# Patient Record
Sex: FEMALE | Race: WHITE | NOT HISPANIC OR LATINO | Employment: OTHER | URBAN - METROPOLITAN AREA
[De-identification: names, ages, dates, MRNs, and addresses within clinical notes are randomized per-mention and may not be internally consistent; named-entity substitution may affect disease eponyms.]

---

## 2018-11-07 ENCOUNTER — APPOINTMENT (OUTPATIENT)
Dept: WOMENS IMAGING | Facility: HOSPITAL | Age: 63
End: 2018-11-07

## 2018-11-07 PROCEDURE — 77067 SCR MAMMO BI INCL CAD: CPT | Performed by: RADIOLOGY

## 2018-11-07 PROCEDURE — 77063 BREAST TOMOSYNTHESIS BI: CPT | Performed by: RADIOLOGY

## 2020-09-21 ENCOUNTER — OFFICE VISIT (OUTPATIENT)
Dept: INTERNAL MEDICINE | Facility: CLINIC | Age: 65
End: 2020-09-21

## 2020-09-21 VITALS
TEMPERATURE: 98 F | OXYGEN SATURATION: 100 % | HEIGHT: 65 IN | WEIGHT: 136 LBS | SYSTOLIC BLOOD PRESSURE: 108 MMHG | DIASTOLIC BLOOD PRESSURE: 58 MMHG | BODY MASS INDEX: 22.66 KG/M2 | HEART RATE: 55 BPM

## 2020-09-21 DIAGNOSIS — E03.9 HYPOTHYROIDISM, UNSPECIFIED TYPE: ICD-10-CM

## 2020-09-21 DIAGNOSIS — E28.39 ESTROGEN DEFICIENCY: ICD-10-CM

## 2020-09-21 DIAGNOSIS — J30.9 ALLERGIC RHINITIS, UNSPECIFIED SEASONALITY, UNSPECIFIED TRIGGER: Primary | ICD-10-CM

## 2020-09-21 PROBLEM — E78.00 PURE HYPERCHOLESTEROLEMIA: Status: ACTIVE | Noted: 2020-05-14

## 2020-09-21 PROCEDURE — 99203 OFFICE O/P NEW LOW 30 MIN: CPT | Performed by: NURSE PRACTITIONER

## 2020-09-21 RX ORDER — ACETAMINOPHEN 160 MG
10000 TABLET,DISINTEGRATING ORAL DAILY
COMMUNITY

## 2020-09-21 RX ORDER — ZINC 25 MG
50 TABLET ORAL DAILY
COMMUNITY
Start: 2020-04-01

## 2020-09-21 RX ORDER — CETIRIZINE HYDROCHLORIDE 5 MG/1
5 TABLET ORAL DAILY
COMMUNITY
End: 2023-02-28

## 2020-09-21 RX ORDER — ECHINACEA PURPUREA EXTRACT 125 MG
1 TABLET ORAL AS NEEDED
COMMUNITY

## 2020-09-21 RX ORDER — LEVOTHYROXINE SODIUM 0.12 MG/1
125 TABLET ORAL DAILY
COMMUNITY
Start: 2020-05-28 | End: 2020-11-03 | Stop reason: SDUPTHER

## 2020-09-21 NOTE — PROGRESS NOTES
Subjective   Jeff Pate is a 65 y.o. female. Patient is here today for   Chief Complaint   Patient presents with   • Hypothyroidism   • Hyperlipidemia   .    History of Present Illness   New patient here to establish care.  Health history and questionnaire have been reviewed in its entirety. The patient's previous primary care provider was Dr. Connie Davies.     Patient has hyperlipidemia. She is intolerant to statins due to muscle aches.     Patient has hypothyroidism and is currently on levothyroxine 125 mcg daily. She does not need a refill at this time.     C/o constant post-nasal drainage which causes her to have to clear her throat frequently. Her voice is hoarse. She has seen an ENT who did a scope and states that her voice change is due to the post-nasal drainage.  She has had allergy testing several years [place].  She would like a referral to an allergist    Patient is postmenopausal and uses a compounded cream from [facility].  She is requesting a refill on this medicine. She had a hysterectomy     The following portions of the patient's history were reviewed and updated as appropriate: allergies, current medications, past family history, past medical history, past social history, past surgical history and problem list.    Review of Systems   Constitutional: Negative.    HENT: Positive for postnasal drip and voice change.    Respiratory: Negative.    Cardiovascular: Negative.    Psychiatric/Behavioral: Negative.        Objective   Vitals:    09/21/20 1409   BP: 108/58   Pulse: 55   Temp: 98 °F (36.7 °C)   SpO2: 100%     Body mass index is 22.63 kg/m².  Physical Exam  Vitals signs and nursing note reviewed.   Constitutional:       Appearance: She is well-developed.   HENT:      Right Ear: Tympanic membrane and ear canal normal.      Left Ear: Tympanic membrane and ear canal normal.   Cardiovascular:      Rate and Rhythm: Normal rate and regular rhythm.       Heart sounds: Normal heart sounds.   Pulmonary:      Effort: Pulmonary effort is normal.      Breath sounds: Normal breath sounds.   Skin:     General: Skin is warm and dry.   Psychiatric:         Speech: Speech normal.         Behavior: Behavior normal.         Thought Content: Thought content normal.         Assessment/Plan   Jeff was seen today for hypothyroidism and hyperlipidemia.    Diagnoses and all orders for this visit:    Allergic rhinitis, unspecified seasonality, unspecified trigger  -     Ambulatory Referral to Allergy    Hypothyroidism, unspecified type    Estrogen deficiency    Patient will have [facility] fax prescription to this office to be signed.     F/u in May 2021 for physical and fasting labs.      Current Outpatient Medications:   •  Ascorbic Acid (Vitamin C) 500 MG chewable tablet, Chew 500 mg Daily., Disp: , Rfl:   •  cetirizine (zyrTEC) 5 MG tablet, Take 5 mg by mouth Daily., Disp: , Rfl:   •  Cholecalciferol (Vitamin D3) 50 MCG (2000 UT) capsule, Take 10,000 Units by mouth Daily., Disp: , Rfl:   •  levothyroxine (SYNTHROID, LEVOTHROID) 125 MCG tablet, Take 125 mcg by mouth Daily., Disp: , Rfl:   •  sodium chloride 0.65 % nasal spray, 1 spray into the nostril(s) as directed by provider As Needed for Congestion., Disp: , Rfl:   •  Unable to find, 1 each 1 (One) Time. Estriol-estradiol (50:50TD), Disp: , Rfl:   •  Zinc 25 MG tablet, Take 50 mg by mouth Daily., Disp: , Rfl:

## 2020-11-03 ENCOUNTER — PATIENT MESSAGE (OUTPATIENT)
Dept: INTERNAL MEDICINE | Facility: CLINIC | Age: 65
End: 2020-11-03

## 2020-11-03 RX ORDER — LEVOTHYROXINE SODIUM 0.12 MG/1
125 TABLET ORAL DAILY
Qty: 90 TABLET | Refills: 1 | Status: SHIPPED | OUTPATIENT
Start: 2020-11-03 | End: 2021-09-13

## 2020-11-03 NOTE — TELEPHONE ENCOUNTER
From: Jeff Pate  To: SYLVAIN Velasco  Sent: 11/3/2020 9:50 AM EST  Subject: Prescription Question    Hi,  I am trying to get refills on my levothyroxine and also my Bi-Est but the refill page says none of my meds can be refilled by [facility] at this time. Please advise.    Levothyroxine is refilled at [facility] - [phone]  Bi-Est is refilled through [facility] - [phone]    I will need both meds by the end of the week.    Thank you.  Jeff Pate (Gerri)

## 2021-02-05 ENCOUNTER — TELEPHONE (OUTPATIENT)
Dept: INTERNAL MEDICINE | Facility: CLINIC | Age: 66
End: 2021-02-05

## 2021-02-05 ENCOUNTER — TELEMEDICINE (OUTPATIENT)
Dept: INTERNAL MEDICINE | Facility: CLINIC | Age: 66
End: 2021-02-05

## 2021-02-05 DIAGNOSIS — Z20.822 FEVER WITH EXPOSURE TO COVID-19 VIRUS: Primary | ICD-10-CM

## 2021-02-05 DIAGNOSIS — R50.9 FEVER WITH EXPOSURE TO COVID-19 VIRUS: Primary | ICD-10-CM

## 2021-02-05 DIAGNOSIS — J06.9 ACUTE URI: ICD-10-CM

## 2021-02-05 DIAGNOSIS — R11.0 NAUSEA: ICD-10-CM

## 2021-02-05 PROCEDURE — 99213 OFFICE O/P EST LOW 20 MIN: CPT | Performed by: NURSE PRACTITIONER

## 2021-02-05 RX ORDER — ONDANSETRON HYDROCHLORIDE 8 MG/1
8 TABLET, FILM COATED ORAL EVERY 8 HOURS PRN
Qty: 12 TABLET | Refills: 0 | Status: SHIPPED | OUTPATIENT
Start: 2021-02-05 | End: 2021-07-12

## 2021-02-05 NOTE — TELEPHONE ENCOUNTER
PT IS CALLING IN STATING THAT HER  TESTED POSITIVE FOR COVID ON Saturday AND PT HAS NOT BEEN TESTED BUT STATES THAT SHE HAS SOME SINUS DRAINAGE AND CONGESTION, LOW GRADE TEMP, AND GETS NAUSEOUS.  PT WANTS TO KNOW IF SOMETHING CAN BE CALLED IN FOR HER TO HELP WITH THIS.    PT CALL BACK  [phone]  PHARMACY  [address]  [phone]

## 2021-02-05 NOTE — PROGRESS NOTES
Chief Complaint  Fever (Pt c/o nausea, fever, sinus drainage X tuesday. PT's  is positive COVID. Pt not wanting to do covid test.), Nausea, and Sinus Problem    Subjective          Jeff Pate presents to [facility] for   History of Present Illness  You have chosen to receive care through a telehealth visit.  Do you consent to use a video/audio connection for your medical care today? Yes    She is here today as a new patient to me for a telehealth visit using [employer] with c/o nausea, fever and sinus drainage that began on Tuesday. The nausea is intermittent, occurs when she gets up. She has not vomited. T max 99.5. Sinus drainage mild. Having fatigue. She is hydrating well and resting.    Denies chest pain, SOA, palpitations, cough, wheezing, dysgeusia or anosmia, dizziness, HA or syncope.     Her  is positive for COVID 19 on Saturday. She has been quarantining at home.  Symptoms staying the same.    Objective   Vital Signs:   There were no vitals taken for this visit.    Physical Exam  Constitutional:       General: She is awake.      Appearance: She is ill-appearing.   Pulmonary:      Effort: Pulmonary effort is normal.   Neurological:      General: No focal deficit present.      Mental Status: She is alert and oriented to person, place, and time. Mental status is at baseline.   Psychiatric:         Attention and Perception: Attention and perception normal.         Mood and Affect: Mood and affect normal.         Speech: Speech normal.         Behavior: Behavior normal. Behavior is cooperative.         Thought Content: Thought content normal.         Cognition and Memory: Cognition and memory normal.         Judgment: Judgment normal.        Result Review :     Common labs    Common Labsle 5/14/20 5/14/20 5/14/20 5/14/20    1011 1011 1011 1011   Glucose  98     BUN  16     Creatinine  0.7     Sodium  139     Potassium  4.2     Chloride  104     Calcium  9.2      Albumin  4.1     Total Bilirubin  0.3     Alkaline Phosphatase  69     AST (SGOT)  19     ALT (SGPT)  14     WBC    7.14   Hemoglobin    13.3   Hematocrit    41.9   Platelets    273   Total Cholesterol   241 (A)    Triglycerides   169 (A)    HDL Cholesterol   46 (A)    LDL Cholesterol    161 (A)    Hemoglobin A1C 5.4      (A) Abnormal value       Comments are available for some flowsheets but are not being displayed.                         Assessment and Plan    Problem List Items Addressed This Visit     None      Visit Diagnoses     Fever with exposure to COVID-19 virus    -  Primary    Nausea        Relevant Medications    ondansetron (Zofran) 8 MG tablet    Acute URI            1. Fever with exposure to COVID 19- she defers COVID 19 test today. Instructed her to continue to quarantine at home x 10 days from symptom onset and until fever free. Tylenol or ibuprofen PRN. Hydrate well, rest.  2. Acute URI- probable COVID 19 viral infection. Continue zinc, vit C, vit D. Hydrate, rest, activity as tolerated, nasal saline spray, warm fluids, mucinex PRN. Notify for worsening symptoms.  3. Nausea- Zofran 8 mg PRN for nausea. Small frequent meals, bland diet, alba and peppermint.    Doximity visit lasting 10 minutes.      Follow Up   No follow-ups on file.  Patient was given instructions and counseling regarding her condition or for health maintenance advice. Please see specific information pulled into the AVS if appropriate.

## 2021-05-03 ENCOUNTER — TELEPHONE (OUTPATIENT)
Dept: INTERNAL MEDICINE | Facility: CLINIC | Age: 66
End: 2021-05-03

## 2021-05-03 NOTE — TELEPHONE ENCOUNTER
Caller: Rio Jeff    Relationship: Self    Best call back number: [phone]     What is the medical concern/diagnosis: MAMMOGRAM    What specialty or service is being requested: MAMMOGRAM    What is the provider, practice or medical service name: [facility]      Any additional details: PATIENT WANTS TO GET MAMMOGRAM REFERRAL OR ORDERS SENT TO [facility] AND SHE NEEDS TO HAVE ULTRA SOUND WITH THAT AS WELL.

## 2021-05-06 ENCOUNTER — OFFICE VISIT (OUTPATIENT)
Dept: INTERNAL MEDICINE | Facility: CLINIC | Age: 66
End: 2021-05-06

## 2021-05-06 VITALS
WEIGHT: 134.4 LBS | HEART RATE: 66 BPM | SYSTOLIC BLOOD PRESSURE: 158 MMHG | BODY MASS INDEX: 22.39 KG/M2 | OXYGEN SATURATION: 99 % | DIASTOLIC BLOOD PRESSURE: 76 MMHG | HEIGHT: 65 IN

## 2021-05-06 DIAGNOSIS — N60.02 BREAST CYST, LEFT: ICD-10-CM

## 2021-05-06 DIAGNOSIS — N64.4 BREAST PAIN: Primary | ICD-10-CM

## 2021-05-06 PROCEDURE — 99214 OFFICE O/P EST MOD 30 MIN: CPT | Performed by: NURSE PRACTITIONER

## 2021-05-06 NOTE — PROGRESS NOTES
Subjective   Jeff Pate is a 65 y.o. female. Patient is here today for   Chief Complaint   Patient presents with   • Breast Mass     Pt complains of having a lump in left breast that is tender and painful x1 week. Pt has been using a heating pad & Tylenol.    .    History of Present Illness   C/o left sided breast pain for about a week associated with a lump.  States that she does have a history of fibrocystic breasts  No discharge. She had increased her caffeine, but has decreased that recently.  She has also tried a heating pad and Tylenol which is helped some.    The following portions of the patient's history were reviewed and updated as appropriate: allergies, current medications, past family history, past medical history, past social history, past surgical history and problem list.    Review of Systems    Objective   Vitals:    05/06/21 0959   BP: 158/76   Pulse: 66   SpO2: 99%     Body mass index is 22.37 kg/m².  Physical Exam  Vitals and nursing note reviewed.   Constitutional:       Appearance: Normal appearance. She is well-developed.   Cardiovascular:      Rate and Rhythm: Normal rate and regular rhythm.      Heart sounds: Normal heart sounds.   Pulmonary:      Effort: Pulmonary effort is normal.      Breath sounds: Normal breath sounds.   Chest:       Skin:     General: Skin is warm and dry.   Neurological:      Mental Status: She is alert and oriented to person, place, and time.   Psychiatric:         Speech: Speech normal.         Behavior: Behavior normal.         Thought Content: Thought content normal.         Assessment/Plan   Diagnoses and all orders for this visit:    1. Breast pain (Primary)  -     Mammo Diagnostic Bilateral With CAD; Future  -     US breast bilateral complete; Future    2. Breast cyst, left  -     US breast bilateral complete; Future    Patient's previous mammograms were done at [facility]. Will call for the results

## 2021-05-24 DIAGNOSIS — E78.5 HYPERLIPEMIA, IDIOPATHIC FAMILIAL: ICD-10-CM

## 2021-05-24 DIAGNOSIS — Z00.00 HEALTHCARE MAINTENANCE: Primary | ICD-10-CM

## 2021-05-24 DIAGNOSIS — E03.9 HYPOTHYROIDISM, UNSPECIFIED TYPE: ICD-10-CM

## 2021-05-26 ENCOUNTER — TELEPHONE (OUTPATIENT)
Dept: INTERNAL MEDICINE | Facility: CLINIC | Age: 66
End: 2021-05-26

## 2021-06-01 DIAGNOSIS — Z00.00 HEALTHCARE MAINTENANCE: ICD-10-CM

## 2021-06-01 DIAGNOSIS — E78.5 HYPERLIPEMIA, IDIOPATHIC FAMILIAL: ICD-10-CM

## 2021-06-01 DIAGNOSIS — E03.9 HYPOTHYROIDISM, UNSPECIFIED TYPE: ICD-10-CM

## 2021-06-11 ENCOUNTER — APPOINTMENT (OUTPATIENT)
Dept: ULTRASOUND IMAGING | Facility: HOSPITAL | Age: 66
End: 2021-06-11

## 2021-06-11 ENCOUNTER — APPOINTMENT (OUTPATIENT)
Dept: MAMMOGRAPHY | Facility: HOSPITAL | Age: 66
End: 2021-06-11

## 2021-07-12 ENCOUNTER — OFFICE VISIT (OUTPATIENT)
Dept: INTERNAL MEDICINE | Facility: CLINIC | Age: 66
End: 2021-07-12

## 2021-07-12 VITALS
HEART RATE: 64 BPM | HEIGHT: 65 IN | WEIGHT: 138 LBS | DIASTOLIC BLOOD PRESSURE: 88 MMHG | OXYGEN SATURATION: 98 % | BODY MASS INDEX: 22.99 KG/M2 | SYSTOLIC BLOOD PRESSURE: 138 MMHG

## 2021-07-12 DIAGNOSIS — Z00.00 HEALTHCARE MAINTENANCE: Primary | ICD-10-CM

## 2021-07-12 DIAGNOSIS — Z12.11 COLON CANCER SCREENING: ICD-10-CM

## 2021-07-12 DIAGNOSIS — E03.9 HYPOTHYROIDISM, UNSPECIFIED TYPE: ICD-10-CM

## 2021-07-12 PROCEDURE — 99397 PER PM REEVAL EST PAT 65+ YR: CPT | Performed by: NURSE PRACTITIONER

## 2021-07-12 NOTE — PROGRESS NOTES
"Subjective   Jeff Pate is a 65 y.o. female and is here for a comprehensive physical exam. The patient reports no problems.    Do you take any herbs or supplements that were not prescribed by a doctor? vit D, vit C, zinc    Patient is here to follow up on hypothyroidism. She is currently on levothyroxine 125 mcg daily (she is taking 1/2 tab = 62.5 mcg/daily). Denies any concerns.    Patient is not fasting today to have her lipids checked.  States that her cholesterol is elevated and she cannot tolerate statins so does not want to have it checked.  She is asking about what dosage of red yeast rice she should take.    States that she checks her BP at home and it is 120/60     History:  Hysterectomy   She has a mammogram ordered in August.  She did have a tender lump a couple months ago and had a mammogram and ultrasound ordered at that time.  Due to insurance cost, patient has put that testing off until next month. States that the tenderness and lump have improved.     The following portions of the patient's history were reviewed and updated as appropriate: allergies, current medications, past family history, past medical history, past social history, past surgical history and problem list.    Review of Systems  Do you have pain that bothers you in your daily life? no  Pertinent items are noted in HPI.    Objective   /88   Pulse 64   Ht 165.1 cm (65\")   Wt 62.6 kg (138 lb)   SpO2 98%   BMI 22.96 kg/m²     General Appearance:    Alert, cooperative, no distress, appears stated age   Head:    Normocephalic, without obvious abnormality, atraumatic   Eyes:    PERRL, conjunctiva/corneas clear, EOM's intact, both eyes   Ears:    Normal TM's and external ear canals, both ears   Nose:   Nares normal, septum midline, mucosa normal, no drainage    or sinus tenderness   Throat:   Lips, mucosa, and tongue normal; teeth and gums normal   Neck:   Supple, symmetrical, trachea midline, no adenopathy;     thyroid:  no " enlargement/tenderness/nodules; no carotid    bruit   Back:     Symmetric, no curvature, ROM normal, no CVA tenderness   Lungs:     Clear to auscultation bilaterally, respirations unlabored   Chest Wall:    No tenderness or deformity    Heart:    Regular rate and rhythm, S1 and S2 normal, no murmur       Abdomen:     Soft, non-tender, bowel sounds active all four quadrants,     no masses, no organomegaly           Extremities:   Extremities normal, atraumatic, no cyanosis or edema   Pulses:   2+ and symmetric all extremities   Skin:   Skin color, texture, turgor normal, no rashes or lesions   Lymph nodes:   Cervical, supraclavicular, and axillary nodes normal   Neurologic:   Grossly intact, normal strength, sensation and reflexes     throughout     Reviewed lipid panel from 5/14/2020  Total cholesterol  241 mg/dL  HDL 46 mg/dL  LDL  161 mg/dL  Triglycerides  169  mg/dL    Assessment/Plan   Healthy female exam.      1. Diagnoses and all orders for this visit:    1. Healthcare maintenance (Primary)  -     DEXA Bone Density Axial; Future    2. Colon cancer screening  -     Cologuard - Stool, Per Rectum; Future    3. Hypothyroidism, unspecified type  -     TSH  -     T4, Free    HLD - Patient will start red yeast rice 2.4 gms daily. Recheck lipids in 6 months.    2. Patient Counseling:  --Nutrition: Stressed importance of moderation in sodium/caffeine intake, saturated fat and cholesterol, caloric balance, sufficient intake of fresh fruits, vegetables, fiber, calcium, iron.   --Exercise: Stressed the importance of regular exercise.    --Injury prevention: Discussed safety belts, safety helmets, smoke detector.   --Dental health: Discussed importance of regular tooth brushing, flossing, and dental visits.   --Immunizations reviewed.    3. Discussed the patient's BMI with her.  The BMI is in the acceptable range  4. Follow up in 6 months with fasting labs prior

## 2021-07-13 ENCOUNTER — APPOINTMENT (OUTPATIENT)
Dept: MAMMOGRAPHY | Facility: HOSPITAL | Age: 66
End: 2021-07-13

## 2021-07-13 ENCOUNTER — APPOINTMENT (OUTPATIENT)
Dept: ULTRASOUND IMAGING | Facility: HOSPITAL | Age: 66
End: 2021-07-13

## 2021-07-13 LAB
T4 FREE SERPL-MCNC: 1.28 NG/DL (ref 0.93–1.7)
TSH SERPL DL<=0.005 MIU/L-ACNC: 2.43 UIU/ML (ref 0.27–4.2)

## 2021-08-03 ENCOUNTER — HOSPITAL ENCOUNTER (OUTPATIENT)
Dept: ULTRASOUND IMAGING | Facility: HOSPITAL | Age: 66
Discharge: HOME | End: 2021-08-03

## 2021-08-03 ENCOUNTER — HOSPITAL ENCOUNTER (OUTPATIENT)
Dept: MAMMOGRAPHY | Facility: HOSPITAL | Age: 66
Discharge: HOME | End: 2021-08-03

## 2021-08-03 DIAGNOSIS — N64.4 BREAST PAIN: ICD-10-CM

## 2021-08-03 DIAGNOSIS — N60.02 BREAST CYST, LEFT: ICD-10-CM

## 2021-08-03 PROCEDURE — 77066 DX MAMMO INCL CAD BI: CPT

## 2021-08-03 PROCEDURE — 76642 ULTRASOUND BREAST LIMITED: CPT

## 2021-08-03 PROCEDURE — G0279 TOMOSYNTHESIS, MAMMO: HCPCS

## 2021-09-13 RX ORDER — LEVOTHYROXINE SODIUM 0.12 MG/1
TABLET ORAL
Qty: 90 TABLET | Refills: 0 | Status: SHIPPED | OUTPATIENT
Start: 2021-09-13 | End: 2022-03-24

## 2021-09-15 DIAGNOSIS — E78.5 HYPERLIPEMIA, IDIOPATHIC FAMILIAL: Primary | ICD-10-CM

## 2021-09-15 DIAGNOSIS — E78.00 PURE HYPERCHOLESTEROLEMIA: ICD-10-CM

## 2021-12-20 ENCOUNTER — APPOINTMENT (OUTPATIENT)
Dept: BONE DENSITY | Facility: HOSPITAL | Age: 66
End: 2021-12-20

## 2022-01-05 LAB
ALBUMIN SERPL-MCNC: 4 G/DL (ref 3.8–4.8)
ALBUMIN/GLOB SERPL: 1.3 {RATIO} (ref 1.2–2.2)
ALP SERPL-CCNC: 97 IU/L (ref 44–121)
ALT SERPL-CCNC: 18 IU/L (ref 0–32)
AST SERPL-CCNC: 18 IU/L (ref 0–40)
BILIRUB SERPL-MCNC: 0.4 MG/DL (ref 0–1.2)
BUN SERPL-MCNC: 14 MG/DL (ref 8–27)
BUN/CREAT SERPL: 18 (ref 12–28)
CALCIUM SERPL-MCNC: 9.4 MG/DL (ref 8.7–10.3)
CHLORIDE SERPL-SCNC: 103 MMOL/L (ref 96–106)
CHOLEST SERPL-MCNC: 250 MG/DL (ref 100–199)
CHOLEST/HDLC SERPL: 6.3 RATIO (ref 0–4.4)
CO2 SERPL-SCNC: 22 MMOL/L (ref 20–29)
CREAT SERPL-MCNC: 0.8 MG/DL (ref 0.57–1)
GLOBULIN SER CALC-MCNC: 3 G/DL (ref 1.5–4.5)
GLUCOSE SERPL-MCNC: 95 MG/DL (ref 65–99)
HDLC SERPL-MCNC: 40 MG/DL
LDLC SERPL CALC-MCNC: 148 MG/DL (ref 0–99)
POTASSIUM SERPL-SCNC: 4.3 MMOL/L (ref 3.5–5.2)
PROT SERPL-MCNC: 7 G/DL (ref 6–8.5)
SODIUM SERPL-SCNC: 142 MMOL/L (ref 134–144)
TRIGL SERPL-MCNC: 337 MG/DL (ref 0–149)
VLDLC SERPL CALC-MCNC: 62 MG/DL (ref 5–40)

## 2022-03-03 DIAGNOSIS — E03.9 HYPOTHYROIDISM, UNSPECIFIED TYPE: ICD-10-CM

## 2022-03-03 DIAGNOSIS — E78.5 HYPERLIPEMIA, IDIOPATHIC FAMILIAL: Primary | ICD-10-CM

## 2022-03-04 LAB
ALBUMIN SERPL-MCNC: 4.3 G/DL (ref 3.8–4.8)
ALBUMIN/GLOB SERPL: 1.4 {RATIO} (ref 1.2–2.2)
ALP SERPL-CCNC: 86 IU/L (ref 44–121)
ALT SERPL-CCNC: 15 IU/L (ref 0–32)
AST SERPL-CCNC: 16 IU/L (ref 0–40)
BASOPHILS # BLD AUTO: 0.1 X10E3/UL (ref 0–0.2)
BASOPHILS NFR BLD AUTO: 1 %
BILIRUB SERPL-MCNC: 0.3 MG/DL (ref 0–1.2)
BUN SERPL-MCNC: 16 MG/DL (ref 8–27)
BUN/CREAT SERPL: 18 (ref 12–28)
CALCIUM SERPL-MCNC: 9.3 MG/DL (ref 8.7–10.3)
CHLORIDE SERPL-SCNC: 104 MMOL/L (ref 96–106)
CO2 SERPL-SCNC: 22 MMOL/L (ref 20–29)
CREAT SERPL-MCNC: 0.87 MG/DL (ref 0.57–1)
EGFR GENE MUT ANL BLD/T: 73 ML/MIN/1.73
EOSINOPHIL # BLD AUTO: 0 X10E3/UL (ref 0–0.4)
EOSINOPHIL NFR BLD AUTO: 0 %
ERYTHROCYTE [DISTWIDTH] IN BLOOD BY AUTOMATED COUNT: 12.7 % (ref 11.7–15.4)
GLOBULIN SER CALC-MCNC: 3.1 G/DL (ref 1.5–4.5)
GLUCOSE SERPL-MCNC: 98 MG/DL (ref 65–99)
HCT VFR BLD AUTO: 40.2 % (ref 34–46.6)
HGB BLD-MCNC: 13.2 G/DL (ref 11.1–15.9)
IMM GRANULOCYTES # BLD AUTO: 0 X10E3/UL (ref 0–0.1)
IMM GRANULOCYTES NFR BLD AUTO: 0 %
LYMPHOCYTES # BLD AUTO: 2.9 X10E3/UL (ref 0.7–3.1)
LYMPHOCYTES NFR BLD AUTO: 42 %
MCH RBC QN AUTO: 29.1 PG (ref 26.6–33)
MCHC RBC AUTO-ENTMCNC: 32.8 G/DL (ref 31.5–35.7)
MCV RBC AUTO: 89 FL (ref 79–97)
MONOCYTES # BLD AUTO: 0.5 X10E3/UL (ref 0.1–0.9)
MONOCYTES NFR BLD AUTO: 7 %
NEUTROPHILS # BLD AUTO: 3.5 X10E3/UL (ref 1.4–7)
NEUTROPHILS NFR BLD AUTO: 50 %
PLATELET # BLD AUTO: 263 X10E3/UL (ref 150–450)
POTASSIUM SERPL-SCNC: 4.6 MMOL/L (ref 3.5–5.2)
PROT SERPL-MCNC: 7.4 G/DL (ref 6–8.5)
RBC # BLD AUTO: 4.54 X10E6/UL (ref 3.77–5.28)
SODIUM SERPL-SCNC: 141 MMOL/L (ref 134–144)
T4 FREE SERPL-MCNC: 1.25 NG/DL (ref 0.82–1.77)
TSH SERPL DL<=0.005 MIU/L-ACNC: 4.21 UIU/ML (ref 0.45–4.5)
WBC # BLD AUTO: 6.9 X10E3/UL (ref 3.4–10.8)

## 2022-03-24 RX ORDER — LEVOTHYROXINE SODIUM 0.12 MG/1
TABLET ORAL
Qty: 90 TABLET | Refills: 0 | Status: SHIPPED | OUTPATIENT
Start: 2022-03-24 | End: 2022-09-20

## 2022-04-12 ENCOUNTER — OFFICE VISIT (OUTPATIENT)
Dept: INTERNAL MEDICINE | Facility: CLINIC | Age: 67
End: 2022-04-12

## 2022-04-12 VITALS
HEART RATE: 64 BPM | BODY MASS INDEX: 22.16 KG/M2 | SYSTOLIC BLOOD PRESSURE: 140 MMHG | OXYGEN SATURATION: 98 % | DIASTOLIC BLOOD PRESSURE: 62 MMHG | WEIGHT: 133 LBS | HEIGHT: 65 IN

## 2022-04-12 DIAGNOSIS — E03.9 HYPOTHYROIDISM, UNSPECIFIED TYPE: Primary | ICD-10-CM

## 2022-04-12 DIAGNOSIS — R06.83 SNORING: ICD-10-CM

## 2022-04-12 PROCEDURE — 99214 OFFICE O/P EST MOD 30 MIN: CPT | Performed by: NURSE PRACTITIONER

## 2022-04-12 NOTE — PROGRESS NOTES
Subjective   Jeff Pate is a 66 y.o. female. Patient is here today for   Chief Complaint   Patient presents with   • Snoring     Pts  mentioned to her that she is snoring & stops breathing sometimes at night..    .    History of Present Illness   C/o snoring for about 5-6 years. 2 weeks ago, her spouse noticed apnea episodes. She would like a referral for a sleep study    Patient is here to follow up on hypothyroidism. Denies any concerns.     Patient has occasional RUQ abdominal pain. States that she watches what she eats which does help. She has also tried Kombucha which helps.     The following portions of the patient's history were reviewed and updated as appropriate: allergies, current medications, past family history, past medical history, past social history, past surgical history and problem list.    Review of Systems    Objective     Vitals:    04/12/22 0934   BP: 140/62   Pulse: 64   SpO2: 98%     Body mass index is 22.13 kg/m².    Patient's BP is slightly elevated today. States that it is 120s/70s at home    Orders Only on 03/03/2022   Component Date Value Ref Range Status   • WBC 03/03/2022 6.9  3.4 - 10.8 x10E3/uL Final   • RBC 03/03/2022 4.54  3.77 - 5.28 x10E6/uL Final   • Hemoglobin 03/03/2022 13.2  11.1 - 15.9 g/dL Final   • Hematocrit 03/03/2022 40.2  34.0 - 46.6 % Final   • MCV 03/03/2022 89  79 - 97 fL Final   • MCH 03/03/2022 29.1  26.6 - 33.0 pg Final   • MCHC 03/03/2022 32.8  31.5 - 35.7 g/dL Final   • RDW 03/03/2022 12.7  11.7 - 15.4 % Final   • Platelets 03/03/2022 263  150 - 450 x10E3/uL Final   • Neutrophil Rel % 03/03/2022 50  Not Estab. % Final   • Lymphocyte Rel % 03/03/2022 42  Not Estab. % Final   • Monocyte Rel % 03/03/2022 7  Not Estab. % Final   • Eosinophil Rel % 03/03/2022 0  Not Estab. % Final   • Basophil Rel % 03/03/2022 1  Not Estab. % Final   • Neutrophils Absolute 03/03/2022 3.5  1.4 - 7.0 x10E3/uL Final   • Lymphocytes Absolute 03/03/2022 2.9  0.7 - 3.1 x10E3/uL  Final   • Monocytes Absolute 03/03/2022 0.5  0.1 - 0.9 x10E3/uL Final   • Eosinophils Absolute 03/03/2022 0.0  0.0 - 0.4 x10E3/uL Final   • Basophils Absolute 03/03/2022 0.1  0.0 - 0.2 x10E3/uL Final   • Immature Granulocyte Rel % 03/03/2022 0  Not Estab. % Final   • Immature Grans Absolute 03/03/2022 0.0  0.0 - 0.1 x10E3/uL Final   • Glucose 03/03/2022 98  65 - 99 mg/dL Final   • BUN 03/03/2022 16  8 - 27 mg/dL Final   • Creatinine 03/03/2022 0.87  0.57 - 1.00 mg/dL Final   • EGFR Result 03/03/2022 73  >59 mL/min/1.73 Final    Comment: **In accordance with recommendations from the NKF-ASN Task force,**    [facility] has updated its eGFR calculation to the 2021 CKD-EPI    creatinine equation that estimates kidney function without a race    variable.     • BUN/Creatinine Ratio 03/03/2022 18  12 - 28 Final   • Sodium 03/03/2022 141  134 - 144 mmol/L Final   • Potassium 03/03/2022 4.6  3.5 - 5.2 mmol/L Final   • Chloride 03/03/2022 104  96 - 106 mmol/L Final   • Total CO2 03/03/2022 22  20 - 29 mmol/L Final   • Calcium 03/03/2022 9.3  8.7 - 10.3 mg/dL Final   • Total Protein 03/03/2022 7.4  6.0 - 8.5 g/dL Final   • Albumin 03/03/2022 4.3  3.8 - 4.8 g/dL Final   • Globulin 03/03/2022 3.1  1.5 - 4.5 g/dL Final   • A/G Ratio 03/03/2022 1.4  1.2 - 2.2 Final   • Total Bilirubin 03/03/2022 0.3  0.0 - 1.2 mg/dL Final   • Alkaline Phosphatase 03/03/2022 86  44 - 121 IU/L Final   • AST (SGOT) 03/03/2022 16  0 - 40 IU/L Final   • ALT (SGPT) 03/03/2022 15  0 - 32 IU/L Final   • TSH 03/03/2022 4.210  0.450 - 4.500 uIU/mL Final   • Free T4 03/03/2022 1.25  0.82 - 1.77 ng/dL Final     Reviewed labs with patient.     Physical Exam  Vitals and nursing note reviewed.   Constitutional:       Appearance: Normal appearance. She is well-developed.   Cardiovascular:      Rate and Rhythm: Normal rate and regular rhythm.      Heart sounds: Normal heart sounds.   Pulmonary:      Effort: Pulmonary effort is normal.      Breath sounds: Normal  breath sounds.   Abdominal:      General: Abdomen is flat. Bowel sounds are normal.      Palpations: Abdomen is soft.      Tenderness: There is no abdominal tenderness.   Skin:     General: Skin is warm and dry.   Neurological:      Mental Status: She is alert and oriented to person, place, and time.   Psychiatric:         Speech: Speech normal.         Behavior: Behavior normal.         Thought Content: Thought content normal.         Assessment/Plan   Diagnoses and all orders for this visit:    1. Hypothyroidism, unspecified type (Primary)    2. Snoring  -     Ambulatory Referral to Sleep Medicine    RU abdominal pain - patient will follow up if her pain continues or worsens.     Hypothyroidism - continue current dosage of levothyroxine.     DEXA at [facility]         Current Outpatient Medications:   •  Ascorbic Acid (Vitamin C) 500 MG chewable tablet, Chew 500 mg Daily., Disp: , Rfl:   •  cetirizine (zyrTEC) 5 MG tablet, Take 5 mg by mouth Daily., Disp: , Rfl:   •  Cholecalciferol (Vitamin D3) 50 MCG (2000 UT) capsule, Take 10,000 Units by mouth Daily., Disp: , Rfl:   •  levothyroxine (SYNTHROID, LEVOTHROID) 125 MCG tablet, TAKE ONE TABLET BY MOUTH EVERY DAY, Disp: 90 tablet, Rfl: 0  •  sodium chloride 0.65 % nasal spray, 1 spray into the nostril(s) as directed by provider As Needed for Congestion., Disp: , Rfl:   •  Unable to find, 1 each 1 (One) Time. Estriol-estradiol (50:50TD), Disp: , Rfl:   •  Zinc 25 MG tablet, Take 50 mg by mouth Daily., Disp: , Rfl:

## 2022-05-31 ENCOUNTER — OFFICE VISIT (OUTPATIENT)
Dept: INTERNAL MEDICINE | Facility: CLINIC | Age: 67
End: 2022-05-31

## 2022-05-31 VITALS
WEIGHT: 137 LBS | HEART RATE: 64 BPM | BODY MASS INDEX: 22.82 KG/M2 | DIASTOLIC BLOOD PRESSURE: 72 MMHG | SYSTOLIC BLOOD PRESSURE: 146 MMHG | TEMPERATURE: 96.3 F | HEIGHT: 65 IN | OXYGEN SATURATION: 97 %

## 2022-05-31 DIAGNOSIS — J01.00 ACUTE NON-RECURRENT MAXILLARY SINUSITIS: Primary | ICD-10-CM

## 2022-05-31 PROCEDURE — 99213 OFFICE O/P EST LOW 20 MIN: CPT | Performed by: NURSE PRACTITIONER

## 2022-05-31 RX ORDER — AMOXICILLIN 875 MG/1
875 TABLET, COATED ORAL 2 TIMES DAILY
Qty: 20 TABLET | Refills: 0 | Status: SHIPPED | OUTPATIENT
Start: 2022-05-31 | End: 2023-02-28

## 2022-05-31 NOTE — PROGRESS NOTES
Subjective   Jeff Pate is a 66 y.o. female. Patient is here today for   Chief Complaint   Patient presents with   • Sore Throat     Pt complains of having a ST & nasal congestion x3 days. Pt has been taking Nyquil.    .    History of Present Illness   C/o nasal congestion x 3 days associated with sore throat, sinus pressure. She has taken Nyquil helps at night. Symptoms are worse at night  She takes claritin daily for allergies. Denies fever, cough.     The following portions of the patient's history were reviewed and updated as appropriate: allergies, current medications, past family history, past medical history, past social history, past surgical history and problem list.    Review of Systems    Objective   Vitals:    05/31/22 1330   BP: 146/72   Pulse: 64   Temp: 96.3 °F (35.7 °C)   SpO2: 97%     Body mass index is 22.8 kg/m².  Physical Exam  Vitals and nursing note reviewed.   Constitutional:       Appearance: Normal appearance. She is well-developed.   Cardiovascular:      Rate and Rhythm: Normal rate and regular rhythm.      Heart sounds: Normal heart sounds.   Pulmonary:      Effort: Pulmonary effort is normal.      Breath sounds: Normal breath sounds.   Skin:     General: Skin is warm and dry.   Neurological:      Mental Status: She is alert and oriented to person, place, and time.   Psychiatric:         Speech: Speech normal.         Behavior: Behavior normal.         Thought Content: Thought content normal.       Patient declined a covid test.    Assessment & Plan   Diagnoses and all orders for this visit:    1. Acute non-recurrent maxillary sinusitis (Primary)  -     amoxicillin (AMOXIL) 875 MG tablet; Take 1 tablet by mouth 2 (Two) Times a Day.  Dispense: 20 tablet; Refill: 0      Continue OTC meds as needed. Increase fluids/rest

## 2022-06-16 ENCOUNTER — APPOINTMENT (OUTPATIENT)
Dept: SLEEP MEDICINE | Facility: HOSPITAL | Age: 67
End: 2022-06-16

## 2022-09-20 RX ORDER — LEVOTHYROXINE SODIUM 0.12 MG/1
TABLET ORAL
Qty: 90 TABLET | Refills: 1 | Status: SHIPPED | OUTPATIENT
Start: 2022-09-20 | End: 2023-02-28 | Stop reason: DRUGHIGH

## 2022-12-29 ENCOUNTER — TELEPHONE (OUTPATIENT)
Dept: INTERNAL MEDICINE | Facility: CLINIC | Age: 67
End: 2022-12-29

## 2022-12-29 NOTE — TELEPHONE ENCOUNTER
Caller: Jeff Pate    Relationship: Self    Best call back number: [phone]    What was the call regarding: PATIENT STATES THE MEDICATION BI-EST CAPSULES IS NEEDING A PRIOR AUTHORIZATION SENT OVER TO THE [facility]. THE PHARMACY TOLD THE PATIENT THAT THEY HAVE REACHED OUT TO TRY TO GET THIS COMPLETED.      PLEASE ADVISE     Do you require a callback: YES

## 2023-01-14 ENCOUNTER — PATIENT MESSAGE (OUTPATIENT)
Dept: INTERNAL MEDICINE | Facility: CLINIC | Age: 68
End: 2023-01-14
Payer: COMMERCIAL

## 2023-02-28 ENCOUNTER — TELEPHONE (OUTPATIENT)
Dept: INTERNAL MEDICINE | Facility: CLINIC | Age: 68
End: 2023-02-28

## 2023-02-28 ENCOUNTER — OFFICE VISIT (OUTPATIENT)
Dept: INTERNAL MEDICINE | Facility: CLINIC | Age: 68
End: 2023-02-28
Payer: COMMERCIAL

## 2023-02-28 VITALS
DIASTOLIC BLOOD PRESSURE: 60 MMHG | HEIGHT: 65 IN | BODY MASS INDEX: 22.81 KG/M2 | WEIGHT: 136.9 LBS | OXYGEN SATURATION: 98 % | HEART RATE: 68 BPM | TEMPERATURE: 98.1 F | SYSTOLIC BLOOD PRESSURE: 128 MMHG

## 2023-02-28 DIAGNOSIS — Z00.00 HEALTHCARE MAINTENANCE: Primary | ICD-10-CM

## 2023-02-28 DIAGNOSIS — E78.00 PURE HYPERCHOLESTEROLEMIA: ICD-10-CM

## 2023-02-28 DIAGNOSIS — R53.82 CHRONIC FATIGUE: ICD-10-CM

## 2023-02-28 DIAGNOSIS — Z78.0 MENOPAUSE: ICD-10-CM

## 2023-02-28 DIAGNOSIS — Z82.49 FAMILY HISTORY OF HEART DISEASE: ICD-10-CM

## 2023-02-28 DIAGNOSIS — F32.9 REACTIVE DEPRESSION: ICD-10-CM

## 2023-02-28 DIAGNOSIS — E03.9 HYPOTHYROIDISM, UNSPECIFIED TYPE: ICD-10-CM

## 2023-02-28 PROCEDURE — 99397 PER PM REEVAL EST PAT 65+ YR: CPT | Performed by: NURSE PRACTITIONER

## 2023-02-28 PROCEDURE — 99214 OFFICE O/P EST MOD 30 MIN: CPT | Performed by: NURSE PRACTITIONER

## 2023-02-28 RX ORDER — AMOXICILLIN 500 MG
CAPSULE ORAL
COMMUNITY
Start: 2023-02-01

## 2023-02-28 RX ORDER — LEVOTHYROXINE SODIUM 88 UG/1
88 TABLET ORAL DAILY
Qty: 90 TABLET | Refills: 1 | Status: SHIPPED | OUTPATIENT
Start: 2023-02-28

## 2023-05-03 ENCOUNTER — TELEPHONE (OUTPATIENT)
Dept: INTERNAL MEDICINE | Facility: CLINIC | Age: 68
End: 2023-05-03

## 2023-05-03 NOTE — TELEPHONE ENCOUNTER
"Caller: Jeff Pate \"Kristy\"    Relationship: Self    Best call back number: [phone]    What medication are you requesting: BI-EST (50-50) PROG-TEST    If a prescription is needed, what is your preferred pharmacy and phone number: [facility] - [place], [place] - [address] - [phone] [facility] [phone] FX     Additional notes:MARIUSZ STATED SHE PREVIOUSLY WAS PRESCRIBED THIS MEDICATION BY JAYME MATTHEWS, AND WILL NEED THIS SENT TO HER COMPOUNDING PHARMACY ASAP.    PATIENT STATED THE PHARMACY WILL BE CLOSED ON 05-, AND SHE WOULD LIKE TO  BEFORE THEN.  "

## 2023-09-07 DIAGNOSIS — E03.9 HYPOTHYROIDISM, UNSPECIFIED TYPE: ICD-10-CM

## 2023-09-07 RX ORDER — LEVOTHYROXINE SODIUM 88 UG/1
TABLET ORAL
Qty: 90 TABLET | Refills: 1 | Status: SHIPPED | OUTPATIENT
Start: 2023-09-07

## 2023-09-21 ENCOUNTER — TELEPHONE (OUTPATIENT)
Dept: INTERNAL MEDICINE | Facility: CLINIC | Age: 68
End: 2023-09-21
Payer: COMMERCIAL

## 2023-09-21 DIAGNOSIS — E78.00 PURE HYPERCHOLESTEROLEMIA: ICD-10-CM

## 2023-09-21 DIAGNOSIS — E03.9 HYPOTHYROIDISM, UNSPECIFIED TYPE: Primary | ICD-10-CM

## 2023-09-21 NOTE — TELEPHONE ENCOUNTER
----- Message from Susannah Jean-Baptiste MD sent at 9/21/2023  7:46 AM EDT -----  Cmp flp tsh cbc  ----- Message -----  From: Vanita Fish MA  Sent: 9/20/2023   4:38 PM EDT  To: Susannah Jean-Baptiste MD    PT MUST BE OURS NOW. PLEASE ADVISE ON LABS

## 2023-09-22 LAB
ALBUMIN SERPL-MCNC: 4.4 G/DL (ref 3.5–5.2)
ALBUMIN/GLOB SERPL: 1.6 G/DL
ALP SERPL-CCNC: 82 U/L (ref 39–117)
ALT SERPL-CCNC: 14 U/L (ref 1–33)
AST SERPL-CCNC: 17 U/L (ref 1–32)
BASOPHILS # BLD AUTO: 0.05 10*3/MM3 (ref 0–0.2)
BASOPHILS NFR BLD AUTO: 0.8 % (ref 0–1.5)
BILIRUB SERPL-MCNC: 0.3 MG/DL (ref 0–1.2)
BUN SERPL-MCNC: 20 MG/DL (ref 8–23)
BUN/CREAT SERPL: 27 (ref 7–25)
CALCIUM SERPL-MCNC: 9.8 MG/DL (ref 8.6–10.5)
CHLORIDE SERPL-SCNC: 104 MMOL/L (ref 98–107)
CHOLEST SERPL-MCNC: 234 MG/DL (ref 0–200)
CO2 SERPL-SCNC: 26.4 MMOL/L (ref 22–29)
CREAT SERPL-MCNC: 0.74 MG/DL (ref 0.57–1)
EGFRCR SERPLBLD CKD-EPI 2021: 87.7 ML/MIN/1.73
EOSINOPHIL # BLD AUTO: 0.03 10*3/MM3 (ref 0–0.4)
EOSINOPHIL NFR BLD AUTO: 0.5 % (ref 0.3–6.2)
ERYTHROCYTE [DISTWIDTH] IN BLOOD BY AUTOMATED COUNT: 13 % (ref 12.3–15.4)
GLOBULIN SER CALC-MCNC: 2.8 GM/DL
GLUCOSE SERPL-MCNC: 106 MG/DL (ref 65–99)
HCT VFR BLD AUTO: 40.5 % (ref 34–46.6)
HDLC SERPL-MCNC: 46 MG/DL (ref 40–60)
HGB BLD-MCNC: 13.2 G/DL (ref 12–15.9)
IMM GRANULOCYTES # BLD AUTO: 0.01 10*3/MM3 (ref 0–0.05)
IMM GRANULOCYTES NFR BLD AUTO: 0.2 % (ref 0–0.5)
LDLC SERPL CALC-MCNC: 154 MG/DL (ref 0–100)
LDLC/HDLC SERPL: 3.29 {RATIO}
LYMPHOCYTES # BLD AUTO: 2.44 10*3/MM3 (ref 0.7–3.1)
LYMPHOCYTES NFR BLD AUTO: 38.1 % (ref 19.6–45.3)
MCH RBC QN AUTO: 28.3 PG (ref 26.6–33)
MCHC RBC AUTO-ENTMCNC: 32.6 G/DL (ref 31.5–35.7)
MCV RBC AUTO: 86.7 FL (ref 79–97)
MONOCYTES # BLD AUTO: 0.46 10*3/MM3 (ref 0.1–0.9)
MONOCYTES NFR BLD AUTO: 7.2 % (ref 5–12)
NEUTROPHILS # BLD AUTO: 3.41 10*3/MM3 (ref 1.7–7)
NEUTROPHILS NFR BLD AUTO: 53.2 % (ref 42.7–76)
NRBC BLD AUTO-RTO: 0 /100 WBC (ref 0–0.2)
PLATELET # BLD AUTO: 264 10*3/MM3 (ref 140–450)
POTASSIUM SERPL-SCNC: 4.5 MMOL/L (ref 3.5–5.2)
PROT SERPL-MCNC: 7.2 G/DL (ref 6–8.5)
RBC # BLD AUTO: 4.67 10*6/MM3 (ref 3.77–5.28)
SODIUM SERPL-SCNC: 141 MMOL/L (ref 136–145)
T4 FREE SERPL-MCNC: 1.63 NG/DL (ref 0.93–1.7)
TRIGL SERPL-MCNC: 184 MG/DL (ref 0–150)
TSH SERPL DL<=0.005 MIU/L-ACNC: 0.09 UIU/ML (ref 0.27–4.2)
VLDLC SERPL CALC-MCNC: 34 MG/DL (ref 5–40)
WBC # BLD AUTO: 6.4 10*3/MM3 (ref 3.4–10.8)

## 2023-09-28 ENCOUNTER — OFFICE VISIT (OUTPATIENT)
Dept: INTERNAL MEDICINE | Facility: CLINIC | Age: 68
End: 2023-09-28
Payer: COMMERCIAL

## 2023-09-28 VITALS
DIASTOLIC BLOOD PRESSURE: 68 MMHG | SYSTOLIC BLOOD PRESSURE: 130 MMHG | HEIGHT: 65 IN | WEIGHT: 132.7 LBS | HEART RATE: 57 BPM | BODY MASS INDEX: 22.11 KG/M2 | TEMPERATURE: 98.2 F | OXYGEN SATURATION: 98 %

## 2023-09-28 DIAGNOSIS — E03.9 HYPOTHYROIDISM, UNSPECIFIED TYPE: Primary | ICD-10-CM

## 2023-09-28 DIAGNOSIS — F32.9 REACTIVE DEPRESSION: ICD-10-CM

## 2023-09-28 DIAGNOSIS — R06.83 SNORING: ICD-10-CM

## 2023-09-28 PROCEDURE — 99214 OFFICE O/P EST MOD 30 MIN: CPT | Performed by: INTERNAL MEDICINE

## 2023-09-28 RX ORDER — CETIRIZINE HYDROCHLORIDE 10 MG/1
10 TABLET ORAL DAILY PRN
COMMUNITY

## 2023-09-28 NOTE — PROGRESS NOTES
Subjective   Jeff Pate is a 68 y.o. female.     Hypothyroidism    Hyperlipidemia  Exacerbating diseases include hypothyroidism.    Pt has been doing well with thyroid meds.  Taking as perscribed without any complications  She has been stable with zyrtec  She does feel tired all the time even if she sleeps well    The following portions of the patient's history were reviewed and updated as appropriate: allergies, current medications, past family history, past medical history, past social history, past surgical history, and problem list.    Review of Systems    Objective   Physical Exam  Vitals reviewed.   Constitutional:       Appearance: She is well-developed.   HENT:      Head: Normocephalic and atraumatic.      Right Ear: External ear normal.      Left Ear: External ear normal.   Eyes:      Conjunctiva/sclera: Conjunctivae normal.      Pupils: Pupils are equal, round, and reactive to light.   Neck:      Thyroid: No thyromegaly.      Trachea: No tracheal deviation.   Cardiovascular:      Rate and Rhythm: Normal rate and regular rhythm.      Heart sounds: Normal heart sounds.   Pulmonary:      Effort: Pulmonary effort is normal.      Breath sounds: Normal breath sounds.   Abdominal:      General: Bowel sounds are normal. There is no distension.      Palpations: Abdomen is soft.      Tenderness: There is no abdominal tenderness.   Musculoskeletal:         General: No deformity. Normal range of motion.      Cervical back: Normal range of motion.   Skin:     General: Skin is warm and dry.   Neurological:      Mental Status: She is alert and oriented to person, place, and time.   Psychiatric:         Behavior: Behavior normal.         Thought Content: Thought content normal.         Judgment: Judgment normal.       Vitals:    09/28/23 0953   BP: 130/68   Pulse: 57   Temp: 98.2 °F (36.8 °C)   SpO2: 98%     Body mass index is 22.08 kg/m².         Assessment & Plan   Diagnoses and all orders for this visit:    1.  Hypothyroidism, unspecified type (Primary)  -     TSH; Future  -     T3, free; Future  -     T4, free; Future  -     CBC & Differential; Future  -     Comprehensive Metabolic Panel; Future  -     LP+LDL / HDL Ratio ([facility]); Future  -     TSH Rfx On Abnormal To Free T4; Future  -     Hemoglobin A1c; Future    2. Reactive depression  -     CBC & Differential; Future  -     Comprehensive Metabolic Panel; Future  -     LP+LDL / HDL Ratio ([facility]); Future  -     TSH Rfx On Abnormal To Free T4; Future  -     Hemoglobin A1c; Future    3. Snoring  -     Ambulatory Referral to Sleep Medicine  -     CBC & Differential; Future  -     Comprehensive Metabolic Panel; Future  -     LP+LDL / HDL Ratio ([facility]); Future  -     TSH Rfx On Abnormal To Free T4; Future  -     Hemoglobin A1c; Future     Snoring-  ? Sleep apnea-  lots of fatigue  Depression-  she says its just her life and she does not want any meds  Hypothyroidism-  she is running hyper  rec cuting the 88mcg in half and rechec 6 weeks

## 2023-11-07 ENCOUNTER — TELEPHONE (OUTPATIENT)
Dept: INTERNAL MEDICINE | Facility: CLINIC | Age: 68
End: 2023-11-07
Payer: COMMERCIAL

## 2023-11-07 RX ORDER — AMOXICILLIN 875 MG/1
875 TABLET, COATED ORAL 2 TIMES DAILY
Qty: 14 TABLET | Refills: 0 | Status: SHIPPED | OUTPATIENT
Start: 2023-11-07

## 2023-11-07 NOTE — TELEPHONE ENCOUNTER
----- Message from Jeff Pate sent at 11/7/2023  3:26 PM EST -----  Regarding: Allergic rhinotis  Contact: [phone]  Yes M'am that would be great!!

## 2023-11-13 ENCOUNTER — OFFICE VISIT (OUTPATIENT)
Dept: INTERNAL MEDICINE | Facility: CLINIC | Age: 68
End: 2023-11-13
Payer: COMMERCIAL

## 2023-11-13 VITALS
OXYGEN SATURATION: 98 % | WEIGHT: 136.1 LBS | TEMPERATURE: 98.2 F | DIASTOLIC BLOOD PRESSURE: 76 MMHG | HEART RATE: 70 BPM | SYSTOLIC BLOOD PRESSURE: 138 MMHG | HEIGHT: 65 IN | BODY MASS INDEX: 22.67 KG/M2

## 2023-11-13 DIAGNOSIS — R55 SYNCOPE, UNSPECIFIED SYNCOPE TYPE: Primary | ICD-10-CM

## 2023-11-13 PROCEDURE — 93000 ELECTROCARDIOGRAM COMPLETE: CPT | Performed by: INTERNAL MEDICINE

## 2023-11-13 PROCEDURE — 99214 OFFICE O/P EST MOD 30 MIN: CPT | Performed by: INTERNAL MEDICINE

## 2023-11-13 NOTE — PROGRESS NOTES
Subjective   Jeff Pate is a 68 y.o. female.   Pt had a syncopal episode 1 week ago    History of Present Illness   She was busy in the am and felt light headed and nauseated and she then passed out  no CP  no sob she woke up within a few seconds      The following portions of the patient's history were reviewed and updated as appropriate: allergies, current medications, past family history, past medical history, past social history, past surgical history, and problem list.  No change in meds or diet  she does not drink much water    Review of Systems   All other systems reviewed and are negative.    Objective   Physical Exam  Vitals reviewed.   Constitutional:       Appearance: She is well-developed.   HENT:      Head: Normocephalic and atraumatic.      Right Ear: External ear normal.      Left Ear: External ear normal.   Eyes:      Conjunctiva/sclera: Conjunctivae normal.      Pupils: Pupils are equal, round, and reactive to light.   Neck:      Thyroid: No thyromegaly.      Trachea: No tracheal deviation.   Cardiovascular:      Rate and Rhythm: Normal rate and regular rhythm.      Heart sounds: Normal heart sounds.   Pulmonary:      Effort: Pulmonary effort is normal.      Breath sounds: Normal breath sounds.   Abdominal:      General: Bowel sounds are normal. There is no distension.      Palpations: Abdomen is soft.      Tenderness: There is no abdominal tenderness.   Musculoskeletal:         General: No deformity. Normal range of motion.      Cervical back: Normal range of motion.   Skin:     General: Skin is warm and dry.   Neurological:      Mental Status: She is alert and oriented to person, place, and time.   Psychiatric:         Behavior: Behavior normal.         Thought Content: Thought content normal.         Judgment: Judgment normal.       Vitals:    11/13/23 1309   BP: 138/76   Pulse: 70   Temp: 98.2 °F (36.8 °C)   SpO2: 98%     Body mass index is 22.65 kg/m².       ECG 12 Lead    Date/Time:  11/13/2023 2:07 PM  Performed by: Susannah Jean-Baptiste MD    Authorized by: Susannah Jean-Baptiste MD  Previous ECG: no previous ECG available  Rhythm: sinus rhythm  Rate: normal  ST Segments: ST segments normal  T Waves: T waves normal  QRS axis: normal    Clinical impression: normal ECG        Assessment & Plan   Diagnoses and all orders for this visit:    1. Syncope, unspecified syncope type (Primary)  -     CBC & Differential  -     Comprehensive Metabolic Panel      Syncope-really sounds vasovagal given associated GI sx  she has never had this before  EKG was ok  check labs today  if any further sx she will let me know and may refer to cards

## 2023-11-15 ENCOUNTER — TELEPHONE (OUTPATIENT)
Dept: INTERNAL MEDICINE | Facility: CLINIC | Age: 68
End: 2023-11-15
Payer: COMMERCIAL

## 2023-11-15 DIAGNOSIS — E03.9 HYPOTHYROIDISM, UNSPECIFIED TYPE: Primary | ICD-10-CM

## 2023-11-15 RX ORDER — LEVOTHYROXINE SODIUM 0.07 MG/1
75 TABLET ORAL DAILY
Qty: 30 TABLET | Refills: 1 | Status: SHIPPED | OUTPATIENT
Start: 2023-11-15

## 2023-11-15 NOTE — TELEPHONE ENCOUNTER
Rx sent to pharmacy, patient aware of results. Recheck labs ordered.      ----- Message from Susannah Jean-Baptiste MD sent at 11/15/2023  2:22 PM EST -----  Change her to 75 mcg a day.  We will recheck those same labs again in 6 weeks  ----- Message -----  From: Marcia Fulton MA  Sent: 11/15/2023   2:21 PM EST  To: Susannah Jean-Baptiste MD    Patient aware of results. Patient was taking a whole 88 mcg tablet and is now taking 1/2 tablet of the 88 mcg.

## 2024-01-10 LAB
T3FREE SERPL-MCNC: 2.7 PG/ML (ref 2–4.4)
T4 FREE SERPL-MCNC: 1.64 NG/DL (ref 0.93–1.7)
TSH SERPL DL<=0.005 MIU/L-ACNC: 0.72 UIU/ML (ref 0.27–4.2)

## 2024-01-15 RX ORDER — LEVOTHYROXINE SODIUM 0.07 MG/1
75 TABLET ORAL DAILY
Qty: 90 TABLET | Refills: 1 | Status: SHIPPED | OUTPATIENT
Start: 2024-01-15

## 2024-03-20 DIAGNOSIS — E03.9 HYPOTHYROIDISM, UNSPECIFIED TYPE: ICD-10-CM

## 2024-03-20 DIAGNOSIS — F32.9 REACTIVE DEPRESSION: ICD-10-CM

## 2024-03-20 DIAGNOSIS — R06.83 SNORING: ICD-10-CM

## 2024-03-22 LAB
ALBUMIN SERPL-MCNC: 4.4 G/DL (ref 3.5–5.2)
ALBUMIN/GLOB SERPL: 1.6 G/DL
ALP SERPL-CCNC: 99 U/L (ref 39–117)
ALT SERPL-CCNC: 18 U/L (ref 1–33)
AST SERPL-CCNC: 18 U/L (ref 1–32)
BASOPHILS # BLD AUTO: 0.05 10*3/MM3 (ref 0–0.2)
BASOPHILS NFR BLD AUTO: 0.8 % (ref 0–1.5)
BILIRUB SERPL-MCNC: 0.4 MG/DL (ref 0–1.2)
BUN SERPL-MCNC: 16 MG/DL (ref 8–23)
BUN/CREAT SERPL: 19.8 (ref 7–25)
CALCIUM SERPL-MCNC: 9.4 MG/DL (ref 8.6–10.5)
CHLORIDE SERPL-SCNC: 105 MMOL/L (ref 98–107)
CHOLEST SERPL-MCNC: 267 MG/DL (ref 0–200)
CO2 SERPL-SCNC: 28 MMOL/L (ref 22–29)
CREAT SERPL-MCNC: 0.81 MG/DL (ref 0.57–1)
EGFRCR SERPLBLD CKD-EPI 2021: 78.7 ML/MIN/1.73
EOSINOPHIL # BLD AUTO: 0.03 10*3/MM3 (ref 0–0.4)
EOSINOPHIL NFR BLD AUTO: 0.5 % (ref 0.3–6.2)
ERYTHROCYTE [DISTWIDTH] IN BLOOD BY AUTOMATED COUNT: 12.3 % (ref 12.3–15.4)
GLOBULIN SER CALC-MCNC: 2.7 GM/DL
GLUCOSE SERPL-MCNC: 91 MG/DL (ref 65–99)
HBA1C MFR BLD: 5.7 % (ref 4.8–5.6)
HCT VFR BLD AUTO: 42.2 % (ref 34–46.6)
HDLC SERPL-MCNC: 43 MG/DL (ref 40–60)
HGB BLD-MCNC: 13.5 G/DL (ref 12–15.9)
IMM GRANULOCYTES # BLD AUTO: 0.01 10*3/MM3 (ref 0–0.05)
IMM GRANULOCYTES NFR BLD AUTO: 0.2 % (ref 0–0.5)
LDLC SERPL CALC-MCNC: 170 MG/DL (ref 0–100)
LDLC/HDLC SERPL: 3.9 {RATIO}
LYMPHOCYTES # BLD AUTO: 2.4 10*3/MM3 (ref 0.7–3.1)
LYMPHOCYTES NFR BLD AUTO: 37.9 % (ref 19.6–45.3)
MCH RBC QN AUTO: 27.5 PG (ref 26.6–33)
MCHC RBC AUTO-ENTMCNC: 32 G/DL (ref 31.5–35.7)
MCV RBC AUTO: 85.9 FL (ref 79–97)
MONOCYTES # BLD AUTO: 0.38 10*3/MM3 (ref 0.1–0.9)
MONOCYTES NFR BLD AUTO: 6 % (ref 5–12)
NEUTROPHILS # BLD AUTO: 3.47 10*3/MM3 (ref 1.7–7)
NEUTROPHILS NFR BLD AUTO: 54.6 % (ref 42.7–76)
NRBC BLD AUTO-RTO: 0 /100 WBC (ref 0–0.2)
PLATELET # BLD AUTO: 293 10*3/MM3 (ref 140–450)
POTASSIUM SERPL-SCNC: 4.4 MMOL/L (ref 3.5–5.2)
PROT SERPL-MCNC: 7.1 G/DL (ref 6–8.5)
RBC # BLD AUTO: 4.91 10*6/MM3 (ref 3.77–5.28)
SODIUM SERPL-SCNC: 142 MMOL/L (ref 136–145)
TRIGL SERPL-MCNC: 282 MG/DL (ref 0–150)
TSH SERPL DL<=0.005 MIU/L-ACNC: 1.06 UIU/ML (ref 0.27–4.2)
VLDLC SERPL CALC-MCNC: 54 MG/DL (ref 5–40)
WBC # BLD AUTO: 6.34 10*3/MM3 (ref 3.4–10.8)

## 2024-03-28 ENCOUNTER — OFFICE VISIT (OUTPATIENT)
Dept: INTERNAL MEDICINE | Facility: CLINIC | Age: 69
End: 2024-03-28
Payer: COMMERCIAL

## 2024-03-28 VITALS
HEIGHT: 65 IN | HEART RATE: 59 BPM | WEIGHT: 133.7 LBS | DIASTOLIC BLOOD PRESSURE: 74 MMHG | OXYGEN SATURATION: 99 % | SYSTOLIC BLOOD PRESSURE: 126 MMHG | TEMPERATURE: 97.9 F | BODY MASS INDEX: 22.28 KG/M2

## 2024-03-28 DIAGNOSIS — E03.9 HYPOTHYROIDISM, UNSPECIFIED TYPE: ICD-10-CM

## 2024-03-28 DIAGNOSIS — E78.00 PURE HYPERCHOLESTEROLEMIA: Primary | ICD-10-CM

## 2024-03-28 NOTE — PROGRESS NOTES
Subjective   Jeff Pate is a 68 y.o. female and is here for a comprehensive physical exam. The patient reports problems - hypothyrodism .  Pt has been doing well with thyroid meds.  Taking as perscribed without any complications    Pt is UTD with annual gyn exam and mammo     Do you take any herbs or supplements that were not prescribed by a doctor?       Social History: no tob no etoh  Social History     Socioeconomic History    Marital status:    Tobacco Use    Smoking status: Never    Smokeless tobacco: Never   Vaping Use    Vaping status: Never Used   Substance and Sexual Activity    Alcohol use: Never    Drug use: Never    Sexual activity: Not Currently     Partners: Male       Family History:   Family History   Problem Relation Age of Onset    Heart disease Mother     Anxiety disorder Mother     Depression Mother     Hearing loss Mother     Hyperlipidemia Mother     Parkinsonism Father     Heart disease Father     Skin cancer Father     Anxiety disorder Father     Depression Father     Macular degeneration Maternal Uncle     Liver cancer Maternal Uncle     Parkinsonism Paternal Uncle     Leukemia Maternal Grandmother     Heart disease Maternal Grandfather     Heart disease Paternal Grandfather     Parkinsonism Paternal Uncle        Past Medical History:   Past Medical History:   Diagnosis Date    Allergic 1980    Seasonal, codeine, statin drugs    Allergic rhinitis     with seasonal variation    Anxiety 1985    Under control    Cataract 2022    Depression 1980    Treated    Estrogen deficiency     Gastroesophageal reflux disease without esophagitis     Hyperlipemia, idiopathic familial     Hypothyroidism     Hypothyroidism due to acquired atrophy of thyroid     Irritable bowel syndrome with both constipation and diarrhea     Pneumonia 1966, 1974    Screening cholesterol level     cancel: lipid panel; future           Review of Systems    Pertinent items are noted in HPI.    Objective   /74 (BP  "Location: Left arm, Patient Position: Sitting)   Pulse 59   Temp 97.9 °F (36.6 °C) (Oral)   Ht 165.1 cm (65\")   Wt 60.6 kg (133 lb 11.2 oz)   SpO2 99%   BMI 22.25 kg/m²     General Appearance:    Alert, cooperative, no distress, appears stated age   Head:    Normocephalic, without obvious abnormality, atraumatic   Eyes:    PERRL, conjunctiva/corneas clear, EOM's intact, fundi     benign, both eyes   Ears:    Normal TM's and external ear canals, both ears   Nose:   Nares normal, septum midline, mucosa normal, no drainage    or sinus tenderness   Throat:   Lips, mucosa, and tongue normal; teeth and gums normal   Neck:   Supple, symmetrical, trachea midline, no adenopathy;     thyroid:  no enlargement/tenderness/nodules; no carotid    bruit or JVD   Back:     Symmetric, no curvature, ROM normal, no CVA tenderness   Lungs:     Clear to auscultation bilaterally, respirations unlabored   Chest Wall:    No tenderness or deformity    Heart:    Regular rate and rhythm, S1 and S2 normal, no murmur, rub   or gallop       Abdomen:     Soft, non-tender, bowel sounds active all four quadrants,     no masses, no organomegaly           Extremities:   Extremities normal, atraumatic, no cyanosis or edema   Pulses:   2+ and symmetric all extremities   Skin:   Skin color, texture, turgor normal, no rashes or lesions   Lymph nodes:   Cervical, supraclavicular, and axillary nodes normal   Neurologic:   CNII-XII intact, normal strength, sensation and reflexes     throughout       Vitals:    03/28/24 1123   BP: 126/74   Pulse: 59   Temp: 97.9 °F (36.6 °C)   SpO2: 99%     Body mass index is 22.25 kg/m².      Medications:   Current Outpatient Medications:     Ascorbic Acid (Vitamin C) 500 MG chewable tablet, Chew 500 mg Daily., Disp: , Rfl:     cetirizine (zyrTEC) 10 MG tablet, Take 1 tablet by mouth Daily As Needed for Allergies., Disp: , Rfl:     Cholecalciferol (Vitamin D3) 50 MCG (2000 UT) capsule, Take 5 capsules by mouth Daily., " Disp: , Rfl:     levothyroxine (SYNTHROID, LEVOTHROID) 75 MCG tablet, Take 1 tablet by mouth Daily., Disp: 90 tablet, Rfl: 1    Omega-3 Fatty Acids (fish oil) 1200 MG capsule capsule, , Disp: , Rfl:     sodium chloride 0.65 % nasal spray, 1 spray into the nostril(s) as directed by provider As Needed for Congestion., Disp: , Rfl:     Unable to find, 1 each 1 (One) Time. Estriol-estradiol (50:50TD), Disp: , Rfl:     Zinc 25 MG tablet, Take 50 mg by mouth Daily., Disp: , Rfl:     BMI is within normal parameters. No other follow-up for BMI required.        Assessment & Plan   Healthy female exam.      1. Healthcare Maintenance:  2. Patient Counseling:  --Nutrition: Stressed importance of moderation in sodium/caffeine intake, saturated fat and cholesterol, caloric balance, sufficient intake of fresh fruits, vegetables, fiber, calcium and vit D  --Exercise: she does exercise reg  --Substance Abuse: no tob no etoh  --Dental health: she does go to the dentist reg  --Immunizations reviewed.utd with vaccines  --Discussed benefits of screening colonoscopy.  3.  Hypothyroidism- ok with current thyroid meds

## 2024-05-24 ENCOUNTER — TELEPHONE (OUTPATIENT)
Dept: INTERNAL MEDICINE | Facility: CLINIC | Age: 69
End: 2024-05-24
Payer: COMMERCIAL

## 2024-05-24 NOTE — TELEPHONE ENCOUNTER
[facility] called needing a new refill request for:  BI-EST/(50-50) PROG-TEST 1/100/0.5 MG CAP    They said they would prefer that you call the refill into them at [phone]

## 2024-06-04 ENCOUNTER — TELEPHONE (OUTPATIENT)
Dept: INTERNAL MEDICINE | Facility: CLINIC | Age: 69
End: 2024-06-04
Payer: COMMERCIAL

## 2024-06-04 RX ORDER — AZITHROMYCIN 250 MG/1
TABLET, FILM COATED ORAL
Qty: 6 TABLET | Refills: 0 | Status: SHIPPED | OUTPATIENT
Start: 2024-06-04

## 2024-06-04 NOTE — TELEPHONE ENCOUNTER
"----- Message from Susannah Jean-Baptiste sent at 6/4/2024 12:54 PM EDT -----  Regarding: FW: Ear infection  Contact: [phone]  Go ahead and send a Z-Alonso what is likely viral however since she has surgery this week we can go ahead and empirically treat her  ----- Message -----  From: Marcia Fulton MA  Sent: 6/4/2024   8:20 AM EDT  To: Susannah Jean-Baptiste MD  Subject: FW: Ear infection                                No openings on you or Yarelis's schedule today. Please advise.  ----- Message -----  From: Jeff Ptae \"Kristy\"  Sent: 6/4/2024   7:33 AM EDT  To: Lele Weinberg [address]  Subject: Ear infection                                    Hi,  I am having left otitis media pain and some nasal congestion. My granddaughter had strep last week. I'm scheduled for my second cataract surgery Thursday. Could you call in something for the ear infection? No fever or sore throat.   Thank you!  Kristy  "

## 2024-07-23 RX ORDER — LEVOTHYROXINE SODIUM 0.07 MG/1
75 TABLET ORAL DAILY
Qty: 90 TABLET | Refills: 2 | Status: SHIPPED | OUTPATIENT
Start: 2024-07-23

## 2025-04-02 ENCOUNTER — OFFICE VISIT (OUTPATIENT)
Dept: INTERNAL MEDICINE | Facility: CLINIC | Age: 70
End: 2025-04-02
Payer: COMMERCIAL

## 2025-04-02 VITALS
SYSTOLIC BLOOD PRESSURE: 112 MMHG | OXYGEN SATURATION: 98 % | DIASTOLIC BLOOD PRESSURE: 66 MMHG | HEART RATE: 66 BPM | HEIGHT: 65 IN | WEIGHT: 135.6 LBS | BODY MASS INDEX: 22.59 KG/M2 | TEMPERATURE: 97.9 F

## 2025-04-02 DIAGNOSIS — R06.83 SNORING: ICD-10-CM

## 2025-04-02 DIAGNOSIS — K21.9 GASTROESOPHAGEAL REFLUX DISEASE, UNSPECIFIED WHETHER ESOPHAGITIS PRESENT: ICD-10-CM

## 2025-04-02 DIAGNOSIS — Z00.00 HEALTH CARE MAINTENANCE: Primary | ICD-10-CM

## 2025-04-02 DIAGNOSIS — E03.9 HYPOTHYROIDISM, UNSPECIFIED TYPE: ICD-10-CM

## 2025-04-02 RX ORDER — UBIDECARENONE 100 MG
200 CAPSULE ORAL DAILY
COMMUNITY

## 2025-04-02 RX ORDER — OMEPRAZOLE 40 MG/1
40 CAPSULE, DELAYED RELEASE ORAL DAILY
Qty: 30 CAPSULE | Refills: 2 | Status: SHIPPED | OUTPATIENT
Start: 2025-04-02

## 2025-04-02 NOTE — PROGRESS NOTES
Subjective   Jeff Pate is a 69 y.o. female and is here for a comprehensive physical exam. The patient reports problems - stress with mother dying .  Pt has been doing well with thyroid meds.  Taking as perscribed without any complications  She does occasionally have some chest pressure and a tickle in the back of her throat that makes her cough.  This is definitely worse when she lays down and is also worse with stress.  She does not have any issues with exercise getting short of breath she denies any orthopnea PND or lower extremity edema  Pt is UTD with annual gyn exam and mammo     Do you take any herbs or supplements that were not prescribed by a doctor?       Social History: no tob no etoh  Social History     Socioeconomic History    Marital status:    Tobacco Use    Smoking status: Never    Smokeless tobacco: Never   Vaping Use    Vaping status: Never Used   Substance and Sexual Activity    Alcohol use: Never    Drug use: Never    Sexual activity: Not Currently     Partners: Male       Family History:   Family History   Problem Relation Age of Onset    Heart disease Mother     Anxiety disorder Mother     Depression Mother     Hearing loss Mother     Hyperlipidemia Mother     Arthritis Mother     Parkinsonism Father     Heart disease Father     Skin cancer Father     Anxiety disorder Father     Depression Father     Macular degeneration Maternal Uncle     Liver cancer Maternal Uncle     Cancer Maternal Uncle     Parkinsonism Paternal Uncle     Leukemia Maternal Grandmother     Heart disease Maternal Grandfather     Heart disease Paternal Grandfather     Parkinsonism Paternal Uncle        Past Medical History:   Past Medical History:   Diagnosis Date    Allergic 1980    Seasonal, codeine, statin drugs    Allergic rhinitis     with seasonal variation    Anxiety 1985    Under control    Cataract 2022    Depression 1980    Treated    Estrogen deficiency     Gastroesophageal reflux disease without  "esophagitis     Hyperlipemia, idiopathic familial     Hyperlipidemia 2000    Hypothyroidism     Hypothyroidism due to acquired atrophy of thyroid     Irritable bowel syndrome with both constipation and diarrhea     Pneumonia 1966, 1974    Screening cholesterol level     cancel: lipid panel; future           Review of Systems    Pertinent items are noted in HPI.    Objective   /66 (BP Location: Left arm, Patient Position: Sitting)   Pulse 66   Temp 97.9 °F (36.6 °C) (Oral)   Ht 165.1 cm (65\")   Wt 61.5 kg (135 lb 9.6 oz)   SpO2 98%   BMI 22.57 kg/m²     General Appearance:    Alert, cooperative, no distress, appears stated age   Head:    Normocephalic, without obvious abnormality, atraumatic   Eyes:    PERRL, conjunctiva/corneas clear, EOM's intact, fundi     benign, both eyes   Ears:    Normal TM's and external ear canals, both ears   Nose:   Nares normal, septum midline, mucosa normal, no drainage    or sinus tenderness   Throat:   Lips, mucosa, and tongue normal; teeth and gums normal   Neck:   Supple, symmetrical, trachea midline, no adenopathy;     thyroid:  no enlargement/tenderness/nodules; no carotid    bruit or JVD   Back:     Symmetric, no curvature, ROM normal, no CVA tenderness   Lungs:     Clear to auscultation bilaterally, respirations unlabored   Chest Wall:    No tenderness or deformity    Heart:    Regular rate and rhythm, S1 and S2 normal, no murmur, rub   or gallop       Abdomen:     Soft, non-tender, bowel sounds active all four quadrants,     no masses, no organomegaly           Extremities:   Extremities normal, atraumatic, no cyanosis or edema   Pulses:   2+ and symmetric all extremities   Skin:   Skin color, texture, turgor normal, no rashes or lesions   Lymph nodes:   Cervical, supraclavicular, and axillary nodes normal   Neurologic:   CNII-XII intact, normal strength, sensation and reflexes     throughout       Vitals:    04/02/25 1113   BP: 112/66   Pulse: 66   Temp: 97.9 °F " (36.6 °C)   SpO2: 98%     Body mass index is 22.57 kg/m².      Medications:   Current Outpatient Medications:     Ascorbic Acid (Vitamin C) 500 MG chewable tablet, Chew 500 mg Daily., Disp: , Rfl:     cetirizine (zyrTEC) 10 MG tablet, Take 1 tablet by mouth Daily As Needed for Allergies., Disp: , Rfl:     Cholecalciferol (Vitamin D3) 50 MCG (2000 UT) capsule, Take 5 capsules by mouth Daily., Disp: , Rfl:     coenzyme Q10 100 MG capsule, Take 2 capsules by mouth Daily., Disp: , Rfl:     levothyroxine (SYNTHROID, LEVOTHROID) 75 MCG tablet, Take 1 tablet by mouth Daily., Disp: 90 tablet, Rfl: 2    MAGNESIUM GLYCINATE PO, Take  by mouth., Disp: , Rfl:     Omega-3 Fatty Acids (fish oil) 1200 MG capsule capsule, , Disp: , Rfl:     sodium chloride 0.65 % nasal spray, Administer 1 spray into the nostril(s) as directed by provider As Needed for Congestion., Disp: , Rfl:     Unable to find, 1 each 1 (One) Time. Estriol-estradiol (50:50TD), Disp: , Rfl:     Zinc 25 MG tablet, Take 50 mg by mouth Daily., Disp: , Rfl:     BMI is within normal parameters. No other follow-up for BMI required.        Assessment & Plan   Healthy female exam.      1. Healthcare Maintenance:  2. Patient Counseling:  --Nutrition: Stressed importance of moderation in sodium/caffeine intake, saturated fat and cholesterol, caloric balance, sufficient intake of fresh fruits, vegetables, fiber, calcium and vit D  --Exercise: she does exercise  --Substance Abuse:   --Dental health: no tob no etoh  --Immunizations reviewed.  --Discussed benefits of screening colonoscopy.  3.  Hypothyroidism-  cont current meds  4. Stress- her mother is extremely stressful.  Her sister is able and willing to take in her mother and I do think that is the best option.  Patient is also raising her 13-year-old granddaughter.  This is a lot of stress and is not healthy for anyone involved in the situation   5.  Laryngeal esophageal reflux disease: I think that this is what is  causing her hoarseness and the tickle in her throat.  It may also be causing some of her chest symptoms.  Stress is definitely making this all worse.  We will try proton pump inhibitor.  Instructed patient on the correct way to take it every day for the next 6 weeks.  If symptoms do not improve she will let me know

## 2025-04-21 ENCOUNTER — OFFICE VISIT (OUTPATIENT)
Facility: HOSPITAL | Age: 70
End: 2025-04-21
Payer: COMMERCIAL

## 2025-04-21 VITALS — OXYGEN SATURATION: 96 % | HEIGHT: 65 IN | WEIGHT: 135.8 LBS | BODY MASS INDEX: 22.63 KG/M2 | HEART RATE: 78 BPM

## 2025-04-21 DIAGNOSIS — F41.9 ANXIETY AND DEPRESSION: ICD-10-CM

## 2025-04-21 DIAGNOSIS — G47.8 NON-RESTORATIVE SLEEP: ICD-10-CM

## 2025-04-21 DIAGNOSIS — R06.83 SNORING: ICD-10-CM

## 2025-04-21 DIAGNOSIS — F32.A ANXIETY AND DEPRESSION: ICD-10-CM

## 2025-04-21 DIAGNOSIS — R06.81 WITNESSED EPISODE OF APNEA: Primary | ICD-10-CM

## 2025-04-21 PROCEDURE — G0463 HOSPITAL OUTPT CLINIC VISIT: HCPCS

## 2025-04-21 PROCEDURE — 99204 OFFICE O/P NEW MOD 45 MIN: CPT | Performed by: NURSE PRACTITIONER

## 2025-04-21 NOTE — PROGRESS NOTES
[facility]  [address] [facility]   [address]  [place] [address]  Phone [phone]  Fax [phone]        Jeff Pate  [id number]   1955  69 y.o.  female      Referring Provider and PCP: Susannah Jean-Baptiste MD    Type of service: Initial Sleep Medicine Consult  Date of service: 4/21/2025          CHIEF COMPLAINT: Snoring, witnessed apnea      HISTORY OF PRESENT ILLNESS:  Jeff Pate 69 y.o. was seen today on 4/21/2025 at [facility].    Patient has a history of acid reflux, hypothyroidism, for which the patient follows with outside providers.  Patient has no history of tonsillectomy, adenoidectomy, nasal surgery, UPPP.   Patient presents today with symptoms of snoring, non-restorative sleep, and witnessed apneas.  The symptoms are chronic in nature.  Patient feels she is a light sleeper, wakes up easily.  Has a hard time sleeping more than 4 to 5 hours.  Wakes up tired.   believes she stops breathing at times at night.  Patient also reports that her sleep is sometimes affected due to having to get granddaughter out for school or due to 's nighttime awakenings.    Patient does report chronic anxiety and depression.  Reports no improvement with medications in the past.  Denies SI or HI.  Is under a lot of stress.  Her father passed away from complications of Parkinson's around 2014.  Patient is 95-year-old mother recently moved in with her and is on hospice.  Patient is raising her 13-year-old granddaughter who is in eighth grade.  Patient's  also has leukemia but she reports he is doing okay right now.  Patient does have some family support.  She has been trying to get in for counseling through hospice but has not gotten the resource that she needs.  She would like a referral today to behavioral health to discuss options.          SLEEP HISTORY:  Sleep schedule:  Bedtime: 9 PM weeknights, 10 PM weekends  Wake time: 6 AM weekdays, 7 to 8 AM weekends  Time it  "takes to fall asleep: 30 minutes  Average hours of sleep: 5-6  Number of naps per day: 0    Symptoms:   In addition to the above, patient reports the following associated symptoms:  Have you ever awakened gasping for breath, coughing, choking: No   Change in weight:  No   Morning headaches:  No   Awaken with a sore throat or dry mouth:  Yes   Leg jerking at night:  No   Creepy crawly feeling in legs/urge to move legs: No   Teeth grinding: No   Have you ever awakened at night with a sour taste or burning sensation in your chest:  No   Do you have muscle weakness with laughing or anger:  No   Have you ever felt paralyzed while going to sleep or waking up:  No   Sleepwalking: No   Nightmares: No   Nocturia (urination at night): 1-2 times per night  Memory Problem: No     Medical Conditions (PMH):   Hypothyroidism  Acid reflux  Anxiety and depression    Social history:  Do you drive a commercial vehicle:  No   Shift work:  No   Tobacco use: No  Alcohol use: 0 per week  Caffeinated drinks: 1 per day  Occupation: Retired    Family History (parents and siblings) (pertaining to sleep medicine):  Parkinson's disease: In father  Restless legs: In mother    Medications: reviewed    Allergies:  Bismuth subsalicylate, Codeine, Levofloxacin, and Pravastatin      REVIEW OF SYSTEMS:  Pertinent positive symptoms are:  Snoring  Witnessed apnea  Matthews Sleepiness Scale of Total score: 4   Fatigue  History of vasovagal syncope  Anxiety  Depression          PHYSICAL EXAM:  CONSTITUTINONAL:   Vitals:    04/21/25 1057   Pulse: 78   SpO2: 96%   Weight: 61.6 kg (135 lb 12.8 oz)   Height: 165.1 cm (65\")    Body mass index is 22.6 kg/m².   HEAD: atraumatic, normocephalic   THROAT: tonsils are not significant, Mallampati class II-III  NECK:  , trachea is midline  RESPIRATORY SYSTEM: Respirations even, unlabored, normal rate  CARDIOVASULAR SYSTEM: Normal rate, no edema  NEUROLOGICAL SYSTEM: Alert and oriented x 3  PSYCHIATRIC SYSTEM: Mood is " normal/ appropriate     Office note(s) from care team reviewed. Office note(s) reviewed: 4/2025 primary care note reviewed    Labs/ Test Results Reviewed:  TSH          3/26/2025    09:38   TSH   TSH 1.330        3/2025 CMP level reviewed.  CO2 level within normal limits.          ASSESSMENT AND PLAN:   Witnessed apnea: patient's symptoms and physical examination are concerning for possible sleep apnea.   I discussed the signs, symptoms, and pathophysiology of sleep apnea with this patient.  I also discussed the possible complications of untreated sleep apnea including but not limited to potential risk of resistant hypertension, insulin resistance, pulmonary hypertension, atrial fibrillation or other arrhythmias, heart attack, stroke, nonrestorative sleep with hypersomnia which can increase risk for motor vehicle accidents, etc.   Different testing methods including home-based and lab based sleep studies were discussed with this patient.   Based on patient history and physical examination, will proceed with in-lab polysomnogram, per patient preference.  The order for the sleep study is placed in [facility].  The test will be scheduled after prior authorization has been obtained through patient's insurance.  Discussed overview of treatment options for sleep apnea in the office today including PAP therapy and oral mandibular advancement device, and treatment/ management will be discussed in more detail with this patient after the test is completed.  All questions were answered to patient's satisfaction.   Snoring: snoring is the sound created by turbulent airflow vibrating upper airway soft tissue.  I have also discussed factors affecting snoring including sleep deprivation, sleeping on the back and alcohol ingestion. To minimize snoring, patient is advised to have adequate sleep, sleep on their side, and avoid alcohol and sedative medications around bedtime. Do not drive, operate heavy machinery, or do activities that  require high concentration if feeling tired/drowsy.  Trouble sleeping: Likely multifactorial.  We discussed insomnia mariia and mindfulness mariia[facility].  Evaluate for sleep apnea as possible contributing factor, as above.  Referred to behavioral health for additional resources on anxiety and depression as well.  Anxiety and depression: Denies SI or HI.  Patient would like to proceed with behavioral health referral today.      Patient will follow-up after study, 31 to 90 days after PAP therapy initiated if applicable, or contact the office sooner for questions or concerns. Patient's questions were answered.            Thank you again for asking me to consult on this patient.  Please do not hesitate to call me if you have additional questions or concerns.       Carina Pool DNP, APRN  [facility]

## 2025-05-05 RX ORDER — LEVOTHYROXINE SODIUM 75 UG/1
75 TABLET ORAL DAILY
Qty: 90 TABLET | Refills: 1 | Status: SHIPPED | OUTPATIENT
Start: 2025-05-05

## 2025-06-02 RX ORDER — AMOXICILLIN 875 MG/1
875 TABLET, COATED ORAL 2 TIMES DAILY
Qty: 20 TABLET | Refills: 0 | Status: SHIPPED | OUTPATIENT
Start: 2025-06-02

## 2025-07-21 ENCOUNTER — OFFICE VISIT (OUTPATIENT)
Dept: INTERNAL MEDICINE | Facility: CLINIC | Age: 70
End: 2025-07-21
Payer: COMMERCIAL

## 2025-07-21 VITALS
WEIGHT: 135.8 LBS | BODY MASS INDEX: 22.63 KG/M2 | HEIGHT: 65 IN | OXYGEN SATURATION: 97 % | HEART RATE: 87 BPM | TEMPERATURE: 98.2 F

## 2025-07-21 DIAGNOSIS — J45.20 MILD INTERMITTENT ASTHMA WITHOUT COMPLICATION: ICD-10-CM

## 2025-07-21 DIAGNOSIS — K21.9 GASTROESOPHAGEAL REFLUX DISEASE WITHOUT ESOPHAGITIS: ICD-10-CM

## 2025-07-21 DIAGNOSIS — Z12.11 COLON CANCER SCREENING: ICD-10-CM

## 2025-07-21 DIAGNOSIS — I10 ESSENTIAL HYPERTENSION: ICD-10-CM

## 2025-07-21 DIAGNOSIS — F32.9 REACTIVE DEPRESSION: ICD-10-CM

## 2025-07-21 DIAGNOSIS — E03.9 HYPOTHYROIDISM, UNSPECIFIED TYPE: Primary | ICD-10-CM

## 2025-07-21 DIAGNOSIS — E78.5 HYPERLIPIDEMIA, UNSPECIFIED HYPERLIPIDEMIA TYPE: ICD-10-CM

## 2025-07-21 PROCEDURE — 99214 OFFICE O/P EST MOD 30 MIN: CPT | Performed by: INTERNAL MEDICINE

## 2025-07-21 NOTE — PROGRESS NOTES
Subjective   Jeff Pate is a 69 y.o. female.   Fu with FL  Hoarse     Mother recently passed away  she has been tearful on and off  Pt has been doing well with thyroid meds.  Taking as perscribed without any complications  Pt has been compliant with meds for GERD.  No sx as long as pt takes medicine as prescribed.  No epigastric pain or reflux sx    The following portions of the patient's history were reviewed and updated as appropriate: allergies, current medications, past family history, past medical history, past social history, past surgical history, and problem list.  Mother recently passed.  Hospice was involved  Review of Systems   HENT:  Positive for hoarse voice.        Objective   Physical Exam  Vitals reviewed.   Constitutional:       Appearance: She is well-developed.   HENT:      Head: Normocephalic and atraumatic.      Right Ear: External ear normal.      Left Ear: External ear normal.   Eyes:      Conjunctiva/sclera: Conjunctivae normal.      Pupils: Pupils are equal, round, and reactive to light.   Neck:      Thyroid: No thyromegaly.      Trachea: No tracheal deviation.   Cardiovascular:      Rate and Rhythm: Normal rate and regular rhythm.      Heart sounds: Normal heart sounds.   Pulmonary:      Effort: Pulmonary effort is normal.      Breath sounds: Normal breath sounds.   Abdominal:      General: Bowel sounds are normal. There is no distension.      Palpations: Abdomen is soft.      Tenderness: There is no abdominal tenderness.   Musculoskeletal:         General: No deformity. Normal range of motion.      Cervical back: Normal range of motion.   Skin:     General: Skin is warm and dry.   Neurological:      Mental Status: She is alert and oriented to person, place, and time.   Psychiatric:         Behavior: Behavior normal.         Thought Content: Thought content normal.         Judgment: Judgment normal.         Vitals:    07/21/25 1122   Pulse: 87   Temp: 98.2 °F (36.8 °C)   SpO2: 97%      Body mass index is 22.6 kg/m².         Assessment & Plan   Diagnoses and all orders for this visit:    1. Hypothyroidism, unspecified type (Primary)  -     CBC & Differential; Future  -     Comprehensive Metabolic Panel; Future  -     Lipid Panel With / Chol / HDL Ratio; Future  -     TSH Rfx On Abnormal To Free T4; Future    2. Gastroesophageal reflux disease without esophagitis  -     CBC & Differential; Future  -     Comprehensive Metabolic Panel; Future  -     Lipid Panel With / Chol / HDL Ratio; Future  -     TSH Rfx On Abnormal To Free T4; Future    3. Reactive depression    4. Hyperlipidemia, unspecified hyperlipidemia type  -     CBC & Differential; Future  -     Comprehensive Metabolic Panel; Future  -     Lipid Panel With / Chol / HDL Ratio; Future  -     TSH Rfx On Abnormal To Free T4; Future    5. Colon cancer screening  -     Lipid Panel With / Chol / HDL Ratio; Future    6. Essential hypertension    7. Mild intermittent asthma without complication       Hypothyrodism-  she is stable with current meds  GERD-  did not tolerate the ppi.  I do wonder if her voice clearing could be from chronic acid reflux.  We will go ahead and try an over-the-counter Pepcid AC at night before bed and see if that helps with her throat clearing and mucus  3. Depression-  she is doing well  not wanting meds  over sleeping ok
